# Patient Record
Sex: FEMALE | Race: WHITE | Employment: FULL TIME | URBAN - METROPOLITAN AREA
[De-identification: names, ages, dates, MRNs, and addresses within clinical notes are randomized per-mention and may not be internally consistent; named-entity substitution may affect disease eponyms.]

---

## 2018-07-12 ENCOUNTER — HOSPITAL ENCOUNTER (OUTPATIENT)
Dept: MAMMOGRAPHY | Age: 63
Discharge: HOME | End: 2018-07-12
Attending: INTERNAL MEDICINE | Admitting: INTERNAL MEDICINE

## 2018-07-12 DIAGNOSIS — Z12.31 ENCOUNTER FOR SCREENING MAMMOGRAM FOR BREAST CANCER: ICD-10-CM

## 2019-12-05 ENCOUNTER — HOSPITAL ENCOUNTER (OUTPATIENT)
Dept: WOMENS IMAGING | Age: 64
Discharge: HOME | End: 2019-12-05
Payer: COMMERCIAL

## 2019-12-05 DIAGNOSIS — Z12.31 ENCOUNTER FOR SCREENING MAMMOGRAM FOR MALIGNANT NEOPLASM OF BREAST: ICD-10-CM

## 2019-12-05 PROCEDURE — 77063 BREAST TOMOSYNTHESIS BI: CPT

## 2021-03-02 ENCOUNTER — HOSPITAL ENCOUNTER (OUTPATIENT)
Dept: WOMENS IMAGING | Age: 66
Discharge: HOME | End: 2021-03-02
Payer: COMMERCIAL

## 2021-03-02 DIAGNOSIS — Z12.31 ENCOUNTER FOR SCREENING MAMMOGRAM FOR BREAST CANCER: ICD-10-CM

## 2021-03-02 PROCEDURE — 77063 BREAST TOMOSYNTHESIS BI: CPT

## 2021-12-01 ENCOUNTER — PROCEDURE VISIT (OUTPATIENT)
Dept: SURGERY | Age: 66
End: 2021-12-01
Payer: MEDICARE

## 2021-12-01 VITALS — SYSTOLIC BLOOD PRESSURE: 139 MMHG | TEMPERATURE: 97.5 F | DIASTOLIC BLOOD PRESSURE: 86 MMHG | HEART RATE: 80 BPM

## 2021-12-01 DIAGNOSIS — C44.729 SQUAMOUS CELL CARCINOMA OF LEFT LOWER LEG: Primary | ICD-10-CM

## 2021-12-01 PROCEDURE — 17313 MOHS 1 STAGE T/A/L: CPT | Performed by: DERMATOLOGY

## 2021-12-01 RX ORDER — UBIDECARENONE 75 MG
50 CAPSULE ORAL DAILY
COMMUNITY

## 2021-12-01 NOTE — PROGRESS NOTES
MOHS PROCEDURE NOTE    PHYSICIAN:  Alex Lopez. Artemio Garrido MD, Who operated in two distinct and integrated capacities as the surgeon removing the tissue and as the pathologist examining the tissue. ASSISTANT: Betty Garcia RN     REFERRING PROVIDER:  Keith Rasheed PA-C    PREOPERATIVE DIAGNOSIS: Squamous Cell Carcinoma    SPECIFIC MOHS INDICATIONS:  size, location and need for tissue conservation    AUC SCORIN/9    POSTOPERATIVE DIAGNOSIS: SAME    LOCATION: Left Shin    OPERATIVE PROCEDURE:  MOHS MICROGRAPHIC SURGERY    RECONSTRUCTION OF DEFECT: Second Intention Wound Healing    PREOPERATIVE SIZE: 20x15 MM    DEFECT SIZE: 28x24 MM    LENGTH OF REPAIRED WOUND/SIZE OF FLAP/SIZE OF GRAFT:  N/A MM    ANESTHESIA:  8mL 1% lidocaine with epinephrine 1:100,000 buffered. EBL:  MINIMAL    DURATION OF PROCEDURE:  40 Minutes    POSTOPERATIVE OBSERVATION: 40 Minutes    SPECIMENS:  SEE MOHS MAP    COMPLICATIONS:  NONE    DESCRIPTION OF PROCEDURE:  The patient was given a mirror, as appropriate, and the biopsy site was identified, marked with a surgical marking pen, and verified by the patient. Options for treatment were discussed and the patient was informed that Mohs surgery was the selected treatment based on its lower recurrence rate, given the features listed above, as compared to other treatment modalities such as excision, radiation, or curettage, and agreed with this treatment plan. Risks and benefits including bruising, swelling, bleeding, infection, nerve injury, recurrence, and scarring were discussed with the patient prior to the procedure and a written consent detailing these and other risks was reviewed with the patient and signed. There was a time out for person and procedure verification. The surgical site was prepped with an antiseptic solution. Application of an antiseptic solution was repeated before each surgical stage.       Stage I:  The clinically-apparent tumor was carefully defined and debulked, determining the edge of the surgical excision. A thin layer of tumor-laden tissue was excised with a narrow margin of normal-appearing skin, using the technique of Mohs. A map was prepared to correspond to the area of skin from which it was excised. Hemostasis was achieved using electrosurgery. The wound was bandaged. The tissue was prepared for the cryostat and sectioned. 1 section(s) prepared. Each section was coded, cut, and stained for microscopic examination. The entire base and margins of the excised piece of tissue were examined by the surgeon. The tissue was examined to the level of subcutaneous fat. No tumor was identified at the peripheral margins of stage I of microscopically controlled surgery. DEFECT MANAGEMENT:    REPAIR DESCRIPTION:  After discussion with the patient regarding the various options, it was decided to allow the defect to heal by second intention (granulation). Healing time, wound care and scarring were discussed with the patient and he/she feels capable of taking care of the wound. WOUND COVERAGE:  The wound was cleaned with normal saline solution, dried off, Aquaphor ointment was applied, and the wound was covered. A dressing was applied for stabilization and light pressure. The patient was given detailed oral and written instructions on postoperative care. There were no complications. The patient left the Unit in good medical condition. FOLLOW-UP:  F/u w/c in 2-3 weeks.

## 2021-12-01 NOTE — PATIENT INSTRUCTIONS
[facility] Office Hours:    Monday-Thursday  7:30 AM-4:30 PM    Friday  9:00 AM-1:00 PM  DAILY WOUND CARE-SECOND INTENTION - LEG    1.  Keep the area absolutely dry for 24 to 48 hours. -After 24 to 48 hours you may remove the bandage and shower. 2.  Remove the bandage and clean the wound with mild soap and water. Try to clean off crust and debris. It is important not to allow a scab to form as scabs slow down the healing process. 3. Soak with a diluted vinegar solution of 1 part white table vinegar to 4 parts water for 5 minutes daily to help disinfect the area. 4.  Apply a layer of Vaseline (or Bacitracin if your doctor recommends) to the wound area only. 5.  Cut a piece of Non-stick dressing, such as Telfa, to fit just over the wound and secure it with paper tape. If the wound is small you may use a Band-Aid  6. Elevate your legs whenever you are in a seated position for more than 15 minutes. Wear compression stockings or support hose if possible to reduce swelling. You should continue daily wound care and keep a bandage on until new skin has grown over the entire wound    ? Bleeding: If bleeding occurs, DO NOT remove the bandage. Put firm pressure on the area with gauze for 20 minutes without peeking. If the bleeding continues, apply pressure for 20 minutes more. ? If the bleeding does not stop after you apply pressure, call us right away. If you cant call, go to the nearest emergency room or urgent care facility. POST-OPERATIVE INSTRUCTIONS     1. Activity: Do not lift anything heavier than a gallon of milk for 1 week. Also, avoid strenuous activity such as running, power walking or contact sports. 2.  Eating and drinking: Do not drink alcohol for 48 hours after your procedure. Alcohol increases the chances of bleeding. 3.  Medicines:  -If you have discomfort, take acetaminophen (Tylenol or Extra Strength Tylenol).  Follow the instructions and warning on the bottle. -If your doctor has prescribed you an aspirin daily, please keep taking it. Do not take extra aspirin or medicines containing aspirin (such as [place] and Excedrin) for 48 hours after your procedure. 4.  Symptoms: You may have these symptoms. They are normal and should get better with time:  A. Swelling. Swelling usually increases for 24 to 48 hours after your procedure and then begins to improve. B.  Some soreness and redness around your wound. C.  Bruising which can last for up to 2 weeks    WHAT TO EXPECT FOR THE COMING WEEKS TO MONTHS  1. You may use make-up once the area is well healed. 2.  Vitamin E oil is NOT necessary. A good moisturizer is just as effective. 3.  Sunscreen IS necessary. Use at least an SPF 30 sunscreen daily- even in the winter.    -Scars take from 6 months to 1 year to fully mature. After the area has healed, it may be helpful to gently massage the area with a moisturizer, petroleum jelly (Vaseline) or Aquaphor. This helps to soften the scar tissue.   -The color of the scar will even out over time, but may remain pink for several months. Swelling may also remain for several months, especially if the area is on the legs.       Call us at [phone] right away if you have any of the following symptoms:   Bleeding that you cant stop (see above)   Pain that lasts longer than 48 hours   Your wound becomes more painful, red or hot   Bruising and swelling that does not improve within 48 hours or gets worse suddenly

## 2021-12-02 ENCOUNTER — TELEPHONE (OUTPATIENT)
Dept: SURGERY | Age: 66
End: 2021-12-02

## 2021-12-02 NOTE — TELEPHONE ENCOUNTER
The patient was in the office on 12/1/2021 for mohs located on the left shin with second intention healing. The patient tolerated the procedure well and left the office in good condition. Pain level on post-operative day 1:  none    Any bleeding episode that required pressure to be held, bandage change or a call to the office or MD?  no     Any other issues?:  no    A post-operative telephone call was placed at 12:07pm in order to check on the patient's recovery process. The patient reported doing well and had no complaints other than those listed above, if any. All of the patient's questions were answered.

## 2021-12-15 ENCOUNTER — OFFICE VISIT (OUTPATIENT)
Dept: SURGERY | Age: 66
End: 2021-12-15
Payer: MEDICARE

## 2021-12-15 DIAGNOSIS — Z51.89 VISIT FOR WOUND CHECK: Primary | ICD-10-CM

## 2021-12-15 PROCEDURE — 99213 OFFICE O/P EST LOW 20 MIN: CPT | Performed by: DERMATOLOGY

## 2021-12-15 PROCEDURE — G8484 FLU IMMUNIZE NO ADMIN: HCPCS | Performed by: DERMATOLOGY

## 2021-12-15 PROCEDURE — 3017F COLORECTAL CA SCREEN DOC REV: CPT | Performed by: DERMATOLOGY

## 2021-12-15 PROCEDURE — G8421 BMI NOT CALCULATED: HCPCS | Performed by: DERMATOLOGY

## 2021-12-15 PROCEDURE — 1036F TOBACCO NON-USER: CPT | Performed by: DERMATOLOGY

## 2021-12-15 PROCEDURE — G8428 CUR MEDS NOT DOCUMENT: HCPCS | Performed by: DERMATOLOGY

## 2021-12-15 PROCEDURE — G8400 PT W/DXA NO RESULTS DOC: HCPCS | Performed by: DERMATOLOGY

## 2021-12-15 PROCEDURE — 1090F PRES/ABSN URINE INCON ASSESS: CPT | Performed by: DERMATOLOGY

## 2021-12-15 PROCEDURE — 1123F ACP DISCUSS/DSCN MKR DOCD: CPT | Performed by: DERMATOLOGY

## 2021-12-15 PROCEDURE — 4040F PNEUMOC VAC/ADMIN/RCVD: CPT | Performed by: DERMATOLOGY

## 2021-12-16 NOTE — PROGRESS NOTES
S: The patient is here for wound check s/p Mohs surgery on the left shin with second intent wound healing, 2 week(s) ago. The patient has no complaints. O:  The wound has moderate fibrin. No erythema/purulence/pain. A/P:  Chronic stable problem:  open wound of the left shin s/p Mohs surgery. Status: The wound is healing well by second intention. No s/sx infection/bleeding. Fibrin removed with forceps    The area was cleansed with a gentle cleanser, a small amount of Aquaphor and a non-stick dressing applied. Detailed second intention wound care instructions reviewed with the patient including daily gentle cleansing with mild cleanser such as cetaphil, application of topical petrolatum or aquaphor and wound coverage. Reminder to remove excessive fibrin as necessary, best after cleansing when fibrin is less adherent. Pt education on how to perform this. Healing time discussed. The patient is scheduled for f/u wound check in 4 week(s).     OTC Meds:  aquaphor or vaseline  Prescription Meds:  no  Co-morbid conditions affecting healing (I.e. tobacco, diabetes, pvd, peripheral edema, immunosuppression):  no

## 2022-01-20 ENCOUNTER — OFFICE VISIT (OUTPATIENT)
Dept: SURGERY | Age: 67
End: 2022-01-20
Payer: MEDICARE

## 2022-01-20 DIAGNOSIS — L92.9 HYPERGRANULATION: Primary | ICD-10-CM

## 2022-01-20 PROCEDURE — 17250 CHEM CAUT OF GRANLTJ TISSUE: CPT | Performed by: DERMATOLOGY

## 2022-01-20 NOTE — PROGRESS NOTES
S: The patient is here for wound check s/p Mohs surgery on the left shin  with second intent wound healing, 7 week(s) ago. The patient has no complaints. O:  The wound has hypergranulation tissue and minimal fibrin. No erythema/purulence/pain. A/P:  Chronic stable problem:  open wound of the left shin s/p Mohs surgery. Status: The wound is healing well by second intention. No s/sx infection/bleeding. Fibrin sharply debrided with forceps. Silver nitrate applied to hypergranulation tissue. The area was cleansed with a gentle cleanser, a small amount of Aquaphor and a non-stick dressing applied. Detailed second intention wound care instructions reviewed with the patient including daily gentle cleansing with mild cleanser such as cetaphil, application of topical petrolatum or aquaphor and wound coverage. Reminder to remove excessive fibrin as necessary, best after cleansing when fibrin is less adherent. Pt education on how to perform this. Healing time discussed. The patient is scheduled for f/u wound check in 4 week(s).     OTC Meds:  aquaphor  Prescription Meds:  no  Co-morbid conditions affecting healing (I.e. tobacco, diabetes, pvd, peripheral edema, immunosuppression):  no

## 2022-02-21 ENCOUNTER — OFFICE VISIT (OUTPATIENT)
Dept: SURGERY | Age: 67
End: 2022-02-21
Payer: MEDICARE

## 2022-02-21 DIAGNOSIS — Z51.89 VISIT FOR WOUND CHECK: Primary | ICD-10-CM

## 2022-02-21 PROCEDURE — 99212 OFFICE O/P EST SF 10 MIN: CPT | Performed by: DERMATOLOGY

## 2022-02-21 PROCEDURE — G8400 PT W/DXA NO RESULTS DOC: HCPCS | Performed by: DERMATOLOGY

## 2022-02-21 PROCEDURE — 1123F ACP DISCUSS/DSCN MKR DOCD: CPT | Performed by: DERMATOLOGY

## 2022-02-21 PROCEDURE — G8421 BMI NOT CALCULATED: HCPCS | Performed by: DERMATOLOGY

## 2022-02-21 PROCEDURE — G8428 CUR MEDS NOT DOCUMENT: HCPCS | Performed by: DERMATOLOGY

## 2022-02-21 PROCEDURE — 1036F TOBACCO NON-USER: CPT | Performed by: DERMATOLOGY

## 2022-02-21 PROCEDURE — 1090F PRES/ABSN URINE INCON ASSESS: CPT | Performed by: DERMATOLOGY

## 2022-02-21 PROCEDURE — G8484 FLU IMMUNIZE NO ADMIN: HCPCS | Performed by: DERMATOLOGY

## 2022-02-21 PROCEDURE — 4040F PNEUMOC VAC/ADMIN/RCVD: CPT | Performed by: DERMATOLOGY

## 2022-02-21 PROCEDURE — 3017F COLORECTAL CA SCREEN DOC REV: CPT | Performed by: DERMATOLOGY

## 2022-02-21 NOTE — PROGRESS NOTES
S: The patient is here for wound check s/p Mohs surgery on the left shin with second intent wound healing, 11 week(s) ago. The patient has no complaints. O:  The wound has completely healled. No erythema/purulence/pain.       A/P:  Chronic stable problem: closed wound s/p mohs removal of a nmsc    OTC Meds:  sunscreen  Prescription Meds:  no  Co-morbid conditions affecting healing (I.e. tobacco, diabetes, pvd, peripheral edema, immunosuppression):  no